# Patient Record
Sex: MALE | Race: WHITE | URBAN - METROPOLITAN AREA
[De-identification: names, ages, dates, MRNs, and addresses within clinical notes are randomized per-mention and may not be internally consistent; named-entity substitution may affect disease eponyms.]

---

## 2019-08-09 ENCOUNTER — SLEEP STUDY (OUTPATIENT)
Dept: FAMILY MEDICINE CLINIC | Facility: CLINIC | Age: 39
End: 2019-08-09
Payer: COMMERCIAL

## 2019-08-09 DIAGNOSIS — G47.33 OSA (OBSTRUCTIVE SLEEP APNEA): Primary | ICD-10-CM

## 2019-08-09 PROCEDURE — 95811 POLYSOM 6/>YRS CPAP 4/> PARM: CPT | Performed by: FAMILY MEDICINE

## 2019-08-20 ENCOUNTER — TELEPHONE (OUTPATIENT)
Dept: FAMILY MEDICINE CLINIC | Facility: CLINIC | Age: 39
End: 2019-08-20

## 2019-08-20 NOTE — TELEPHONE ENCOUNTER
Called patient to inform sleep study was read. Patient did not answer and voice mailbox was full. I was unable to leave a message.

## 2019-09-04 ENCOUNTER — TELEPHONE (OUTPATIENT)
Dept: FAMILY MEDICINE CLINIC | Facility: CLINIC | Age: 39
End: 2019-09-04

## 2019-09-05 NOTE — TELEPHONE ENCOUNTER
Pt had diagnostic sleep study on 7/19/19 (ordered by Dr. Fawn Robbins). Follow up to sleep study needed to review results.

## 2019-09-05 NOTE — TELEPHONE ENCOUNTER
Spoke with patient and he states he has his CPAP machine (received from [facility], [facility]) but does not know who ordered the machine (Dr. Sandy Macedo?).  Pt states he will f/u with this office for the 30-90 day compliance visit but cannot schedule appt today, wi